# Patient Record
Sex: FEMALE | Race: WHITE | NOT HISPANIC OR LATINO
[De-identification: names, ages, dates, MRNs, and addresses within clinical notes are randomized per-mention and may not be internally consistent; named-entity substitution may affect disease eponyms.]

---

## 2019-10-22 ENCOUNTER — APPOINTMENT (OUTPATIENT)
Dept: ORTHOPEDIC SURGERY | Facility: CLINIC | Age: 79
End: 2019-10-22
Payer: MEDICARE

## 2019-10-22 PROCEDURE — 73562 X-RAY EXAM OF KNEE 3: CPT | Mod: 50

## 2019-10-22 PROCEDURE — 99204 OFFICE O/P NEW MOD 45 MIN: CPT

## 2019-10-22 PROCEDURE — 73521 X-RAY EXAM HIPS BI 2 VIEWS: CPT

## 2019-10-22 RX ORDER — CELECOXIB 200 MG/1
200 CAPSULE ORAL TWICE DAILY
Qty: 60 | Refills: 2 | Status: ACTIVE | COMMUNITY
Start: 2019-10-22 | End: 1900-01-01

## 2019-10-22 NOTE — DISCUSSION/SUMMARY
[de-identified] : Patient will present a Medrol Dosepak called to address the inflammation in multiple regions. She'll follow up with me in 9 did not really improve for further evaluation.

## 2019-10-22 NOTE — HISTORY OF PRESENT ILLNESS
[de-identified] : TAL KNEE PAIN - LEFT IS WORSE - ALSO HAS LEFT HIP PAINPatient states her left hip pain radiates from her buttock down to the lateral aspect of her thigh leg and foot. His ongoing for a few weeks and cause a specific extra injury patient denies any change in bowel or bladder habits any obvious weakness or numbness the left lower extremity.\par \par SENT FOR NEW XRAYS

## 2019-10-22 NOTE — PHYSICAL EXAM
[de-identified] : Left hip has full passive internal/external rotation negative straight leg raising test is no significant left thigh atrophy. Patient does have mild paraspinal tenderness both left and right-sided paraspinal musculature the lumbar region.\par \par Patient's left knee is definite medial joint line tenderness range of motion is 0-115°. There is crepitus with range of motion about the knee soft tissue and no effusion is noted. [de-identified] : AP and lateral both hips were obtained today show well preserved joint spaces no evidence of arthritis fracture or other bony or valgus.\par \par AP standing and the lateral sunrise view and both knees show moderate to advanced tricompartmental arthritis in both knees more radiographically advanced on the left.

## 2020-06-16 ENCOUNTER — APPOINTMENT (OUTPATIENT)
Dept: ORTHOPEDIC SURGERY | Facility: CLINIC | Age: 80
End: 2020-06-16
Payer: MEDICARE

## 2020-06-16 PROCEDURE — 99214 OFFICE O/P EST MOD 30 MIN: CPT

## 2020-06-16 PROCEDURE — 73060 X-RAY EXAM OF HUMERUS: CPT

## 2020-06-16 PROCEDURE — 73110 X-RAY EXAM OF WRIST: CPT | Mod: 50

## 2020-06-16 PROCEDURE — 73090 X-RAY EXAM OF FOREARM: CPT | Mod: 50

## 2020-06-16 RX ORDER — CELECOXIB 200 MG/1
200 CAPSULE ORAL DAILY
Qty: 30 | Refills: 0 | Status: ACTIVE | COMMUNITY
Start: 2020-06-16 | End: 1900-01-01

## 2020-06-16 NOTE — DISCUSSION/SUMMARY
[Medication Risks Reviewed] : Medication risks reviewed [de-identified] : Patient radiographically and clinically does not have a fracture of her forearm or other areas of soreness were also cleared of fractures. 2 placed on Celebrex 200 mg p.o. twice a day for 6 day course follow up in a week if not thoroughly improve with left forearm pain an MRI be warranted that point.

## 2020-06-16 NOTE — PHYSICAL EXAM
[de-identified] : Patient's amount of fullness in the area of the distal one third of the ulna. There is no point tenderness she has full painless pronation and supination of the extremity neurovascular intact distally with good  strength. [de-identified] : AP lateral of both the right humerus were obtained showing no evidence of fracture AP lateral of the left forearm was obtained showing no evidence of fracture there is an old healed distal one third ulnar fracture both wrists were also radiograph 3 views show no evidence of fracture dislocation or other abnormalities.

## 2020-06-16 NOTE — HISTORY OF PRESENT ILLNESS
[de-identified] : The patient returns with planes of new onset left forearm pain. Patient states about 2 weeks ago she was getting out of bed in the middle of the night stumbled and braced herself by placing her left arm behind her onto the bed. Patient thereafter developed swelling and discomfort in this area she has a remote history of an old ulnar fracture that was treated conservatively but limb.Patient's complaint swelling and soreness she lesion may refracture the area.

## 2020-06-16 NOTE — REASON FOR VISIT
[Initial Visit] : an initial visit for [FreeTextEntry2] : RIGHT FOREARM S/P FALL & AND BL UPPER EXTREMITY PAIN

## 2020-06-23 ENCOUNTER — APPOINTMENT (OUTPATIENT)
Dept: ORTHOPEDIC SURGERY | Facility: CLINIC | Age: 80
End: 2020-06-23

## 2020-10-08 ENCOUNTER — APPOINTMENT (OUTPATIENT)
Dept: ORTHOPEDIC SURGERY | Facility: CLINIC | Age: 80
End: 2020-10-08
Payer: MEDICARE

## 2020-10-08 PROCEDURE — 73030 X-RAY EXAM OF SHOULDER: CPT | Mod: RT

## 2020-10-08 PROCEDURE — 20611 DRAIN/INJ JOINT/BURSA W/US: CPT | Mod: RT

## 2020-10-08 PROCEDURE — 99214 OFFICE O/P EST MOD 30 MIN: CPT | Mod: 25

## 2020-10-08 NOTE — PROCEDURE
[de-identified] : Patient was given a cortisone injection into subacromial space of the right shoulder today. This was done and sterile conditions and ultrasound guidance. Patient tolerated the procedure well

## 2020-10-08 NOTE — HISTORY OF PRESENT ILLNESS
[de-identified] : Visit patient who presents with a new complaint of right shoulder pain. She is difficult with overhead activities she has pain at night decreased internal rotation has difficulty with putting on a dinner jacket. Pain is radiating into the mid aspect of the arm and sometimes into the forearm. She recalls no specific axonal injury.

## 2020-10-08 NOTE — DISCUSSION/SUMMARY
[de-identified] : Patient will follow up with me next Tuesday for follow up visit to assess her symptoms.

## 2020-10-08 NOTE — PHYSICAL EXAM
[de-identified] : Right shoulder patient has good cuff strength and is against resisted pronation and abduction. She is neurovascular intact distally. Positive terminal impingement noted. Minimal tenderness to palpation of the a.c. joint. Neurovascular intact distally. [de-identified] : AP lateral of the right shoulder were obtained showing only minimal degenerative changes of the a.c. joint. Otherwise there are no significant bony abnormalities noted

## 2020-10-13 ENCOUNTER — APPOINTMENT (OUTPATIENT)
Dept: ORTHOPEDIC SURGERY | Facility: CLINIC | Age: 80
End: 2020-10-13
Payer: MEDICARE

## 2020-10-13 DIAGNOSIS — M25.511 PAIN IN RIGHT SHOULDER: ICD-10-CM

## 2020-10-13 PROCEDURE — 20611 DRAIN/INJ JOINT/BURSA W/US: CPT | Mod: 50

## 2020-10-13 PROCEDURE — 99214 OFFICE O/P EST MOD 30 MIN: CPT | Mod: 25

## 2020-10-13 NOTE — HISTORY OF PRESENT ILLNESS
[de-identified] : Patient states she feels significant improvement proximal 90% improved with the previous cortisone injection of the right shoulder. She does have some lingering discomfort with overhead activities and it is not equal to be less previously affected left shoulder.

## 2020-10-13 NOTE — PHYSICAL EXAM
[de-identified] : Right shoulder patient has good cuff strength and is against resisted pronation and abduction. She is neurovascular intact distally. Positive terminal impingement noted. Minimal tenderness to palpation of the a.c. joint. Neurovascular intact distally.\par \par Left shoulder was examined today also has good cuff strength when tested against resisted forward elevation abduction. Neurovascular intact distally positive impingement sign.

## 2020-10-13 NOTE — PROCEDURE
[de-identified] : Patient was given sterile subacromial injections to both right and left shoulder today. Both done under sterile conditions via posterior approach.

## 2020-10-20 ENCOUNTER — APPOINTMENT (OUTPATIENT)
Dept: ORTHOPEDIC SURGERY | Facility: CLINIC | Age: 80
End: 2020-10-20

## 2020-11-12 ENCOUNTER — APPOINTMENT (OUTPATIENT)
Dept: ORTHOPEDIC SURGERY | Facility: CLINIC | Age: 80
End: 2020-11-12
Payer: MEDICARE

## 2020-11-12 VITALS — WEIGHT: 250 LBS | BODY MASS INDEX: 45.73 KG/M2

## 2020-11-12 VITALS — HEIGHT: 62 IN | BODY MASS INDEX: 42.33 KG/M2 | WEIGHT: 230 LBS

## 2020-11-12 VITALS — TEMPERATURE: 97 F

## 2020-11-12 DIAGNOSIS — S46.002A UNSPECIFIED INJURY OF MUSCLE(S) AND TENDON(S) OF THE ROTATOR CUFF OF LEFT SHOULDER, INITIAL ENCOUNTER: ICD-10-CM

## 2020-11-12 PROCEDURE — 73030 X-RAY EXAM OF SHOULDER: CPT | Mod: LT

## 2020-11-12 PROCEDURE — 76882 US LMTD JT/FCL EVL NVASC XTR: CPT | Mod: LT,59

## 2020-11-12 PROCEDURE — 99214 OFFICE O/P EST MOD 30 MIN: CPT | Mod: 25

## 2020-11-12 PROCEDURE — 20611 DRAIN/INJ JOINT/BURSA W/US: CPT | Mod: LT

## 2020-12-02 ENCOUNTER — APPOINTMENT (OUTPATIENT)
Dept: ORTHOPEDIC SURGERY | Facility: CLINIC | Age: 80
End: 2020-12-02
Payer: MEDICARE

## 2020-12-02 PROCEDURE — 99214 OFFICE O/P EST MOD 30 MIN: CPT

## 2020-12-02 RX ORDER — OXYCODONE AND ACETAMINOPHEN 7.5; 325 MG/1; MG/1
7.5-325 TABLET ORAL
Qty: 42 | Refills: 0 | Status: ACTIVE | COMMUNITY
Start: 2020-12-02 | End: 1900-01-01

## 2020-12-02 NOTE — PROCEDURE
[de-identified] : \par DIAGNOSTIC ULTRASOUND LEFT SHOULDER\par \par DIAGNOSTIC SONOGRAPHY of the Rotator Cuff Soft Tissue of the LEFT  SHOULDER was performed in Multiple Scan Planes with varying transducer frequencies.\par Imaging of the Supraspinatus Tendon reveals COMPLETE TEAR - 1- 1.5 cm \par Imaging of the Biceps Tendon reveals no significant tear.\par Imaging of the Subscapularis Tendon reveals no significant tear.\par Imaging of the Infraspinatus Tendon reveals no significant tear.\par Key images were save digitally and reviewed with patient.\par \par \par \par INJECTION LEFT SHOULDER SA SPACE\par \par Patient has demonstrated limited relief from NSAIDS, rest, exercises / PT, and after discussion of the risks and benefits, the patient has elected to proceed with an ULTRASOUND GUIDED injection into the LEFT SUBACROMIAL  SPACE LATERAL APPROACH \par  \par Confirmed that the patient does not have history of prior adverse reactions, active, infections, or relevant allergies. There was no effusion, erythema, or warmth, and the skin was clear\par \par The skin was sterilized with alcohol. Ethyl Chloride was used as a topical anesthetic. Routine sterile technique. \par The site was injected UTILIZING ULTRASOUND GUIDANCE to confirm appropriate placement of the needle-\par with a mixture of medication and local anesthetic. The injection was completed without complication and a bandage was applied.\par  \par The patient tolerated the procedure well and was given post-injection instructions.Rec: Cold therapy, analgesics, avoid heavy activity.\par MEDICATION: 4cc of 1% xylocaine + 10mg of KENALOG\par \par

## 2020-12-02 NOTE — PHYSICAL EXAM
[de-identified] : PHYSICAL EXAM RIGHT   SHOULDER\par \par NORMAL POSTURE / SCAPULAR PROTRACTION\par AROM \par RIGHT   130 / 130 / 80 / 20\par TENDER: SA REGION ANTERIOR AND LATERAL \par \par SPECIAL TESTING :\par STEPHENSON - POSITIVE \par DONALD - POSITIVE \par SPEED TEST - POSITIVE\par \par MONSALVE - NEGATIVE \par APPREHENSION AND SUPPRESSION - NEGATIVE \par \par RC STRENGTH TESTING \par SS:  4/5\par SUB 5/5\par IS     5/5\par BICEPS  5/5\par \par SENSATION  - GROSSLY INTACT\par \par

## 2020-12-02 NOTE — DISCUSSION/SUMMARY
[de-identified] : PREOP SHOULDER SURGERY DISCUSSION:\par \par PROCEDURE DISCUSSED - QUESTIONS ANSWERED\par PATIENT WISHES TO PROCEED\par \par POST OP CARE AND LIMITATIONS REVIEWED - HANDOUT PROVIDED \par \par COLD PACKS RECOMMENDED\par ANALGESICS PRESCRIBED \par \par \par THERE ARE NO GUARANTEES THAT ALL SYMPTOMS WILL BE ALLEVIATED  \par SHOULDER ARTHROSCOPY, ACROMIOPLASTY, DEBRIDEMENT,  RC REPAIR AND LABRUM REPAIRS- ON AVERAGE 75- 85% SATISFACTORY RESULTS FOR TEARS < 3CM AFTER 9-12 MONTHS HEALING AND REHABILITATION. \par \par REPAIRS WILL REQUIRE STRICT SHOULDER IMMOBILIZER 4-6 WEEKS\par \par RC TEARS 3CM OR LARGER MAY REQUIRE COLLAGEN PATCH AUGMENTATION GENERALLY HAVE LESS SATISFACTORY RESULTS\par \par PHYSICAL THERAPY REQUIRED 2X WEEK FOR  MINIMUM 8-12 WEEKS FOR ALL PROCEDURES \par CONTINUED HOME EXERCISES 6-9 MONTHS AFTER THAT REQUIRED FOR OPTIMAL OUTCOMES \par \par ROUTINE SURGICAL AND ANESTHETIC RISKS INCLUDE RISK OF SURGICAL INFECTION, ANESTHETIC COMPLICATION OR ALLERGY, POSSIBLE RETEARS OR PROGRESSION OF TEAR, STIFFNESS OF SHOULDER AND UNSATISFACTORY OUTCOMES\par \par PATIENT UNDERSTANDS AND WISHES TO PROCEED\par

## 2020-12-02 NOTE — HISTORY OF PRESENT ILLNESS
[de-identified] : RIGHT SHOULDER - RHD\par AT LEAST  2 MONTHS\par PAIN LEVEL 7/10, WAS A 10 BEFORE CORTISONE INJECTION\par CONSTANT PAIN\par SHARP\par RADIATING PAIN\par NUMBNESS\par WORSE WITH LIFTING, REACHING ACROSS AND BEHIND\par BETTER WITH CORTISONE INJECTION 10/08/20,  REST \par WEAKNESS\par LACK OF ROM

## 2020-12-02 NOTE — PHYSICAL EXAM
[de-identified] : PHYSICAL EXAM LEFT AND RIGHT   SHOULDER\par \par SCAPULAR PROTRACTION\par AROM \par LEFT  140 / 140 / 80 / 20\par RIGHT   140 / 140 / 80 / 20\par TENDER: SA REGION ANTERIOR AND LATERAL \par \par SPECIAL TESTING :\par STEPHENSON - POSITIVE \par DONALD - POSITIVE \par SPEED TEST - POSITIVE\par \par MONSALVE - NEGATIVE \par APPREHENSION AND SUPPRESSION - NEGATIVE \par \par RC STRENGTH TESTING \par SS:  4/5\par SUB 5/5\par IS     5/5\par BICEPS  5/5\par \par SENSATION  - GROSSLY INTACT\par \par

## 2020-12-02 NOTE — HISTORY OF PRESENT ILLNESS
[de-identified] : RIGHT SHOULDER\par FOLLOW UP\par PRESURGICAL VISIT\par INTERMITTENT PAIN\par PAIN LEVEL 7/10\par \par CORTISONE INJECTION LEFT SHOULDER 11/12/20- SLIGHTLY HELPFUL

## 2020-12-02 NOTE — DISCUSSION/SUMMARY
[de-identified] : PREOP SHOULDER SURGERY DISCUSSION:\par \par PROCEDURE DISCUSSED - QUESTIONS ANSWERED\par PATIENT WISHES TO PROCEED\par \par POST OP CARE AND LIMITATIONS REVIEWED - HANDOUT PROVIDED \par \par COLD PACKS RECOMMENDED\par ANALGESICS PRESCRIBED \par \par \par THERE ARE NO GUARANTEES THAT ALL SYMPTOMS WILL BE ALLEVIATED  \par SHOULDER ARTHROSCOPY, ACROMIOPLASTY, DEBRIDEMENT,  RC REPAIR AND LABRUM REPAIRS- ON AVERAGE 75- 85% SATISFACTORY RESULTS FOR TEARS < 3CM AFTER 9-12 MONTHS HEALING AND REHABILITATION. \par \par REPAIRS WILL REQUIRE STRICT SHOULDER IMMOBILIZER 4-6 WEEKS\par \par RC TEARS 3CM OR LARGER MAY REQUIRE COLLAGEN PATCH AUGMENTATION GENERALLY HAVE LESS SATISFACTORY RESULTS\par \par PHYSICAL THERAPY REQUIRED 2X WEEK FOR  MINIMUM 8-12 WEEKS FOR ALL PROCEDURES \par CONTINUED HOME EXERCISES 6-9 MONTHS AFTER THAT REQUIRED FOR OPTIMAL OUTCOMES \par \par ROUTINE SURGICAL AND ANESTHETIC RISKS INCLUDE RISK OF SURGICAL INFECTION, ANESTHETIC COMPLICATION OR ALLERGY, POSSIBLE RETEARS OR PROGRESSION OF TEAR, STIFFNESS OF SHOULDER AND UNSATISFACTORY OUTCOMES\par \par PATIENT UNDERSTANDS AND WISHES TO PROCEED\par

## 2020-12-07 ENCOUNTER — TRANSCRIPTION ENCOUNTER (OUTPATIENT)
Age: 80
End: 2020-12-07

## 2020-12-08 ENCOUNTER — APPOINTMENT (OUTPATIENT)
Dept: ORTHOPEDIC SURGERY | Facility: HOSPITAL | Age: 80
End: 2020-12-08
Payer: MEDICARE

## 2020-12-08 ENCOUNTER — OUTPATIENT (OUTPATIENT)
Dept: OUTPATIENT SERVICES | Facility: HOSPITAL | Age: 80
LOS: 1 days | Discharge: HOME | End: 2020-12-08
Payer: MEDICARE

## 2020-12-08 VITALS
RESPIRATION RATE: 16 BRPM | OXYGEN SATURATION: 95 % | TEMPERATURE: 97 F | HEIGHT: 61.5 IN | HEART RATE: 88 BPM | SYSTOLIC BLOOD PRESSURE: 130 MMHG | WEIGHT: 230.6 LBS | DIASTOLIC BLOOD PRESSURE: 79 MMHG

## 2020-12-08 VITALS
SYSTOLIC BLOOD PRESSURE: 152 MMHG | OXYGEN SATURATION: 96 % | RESPIRATION RATE: 24 BRPM | DIASTOLIC BLOOD PRESSURE: 78 MMHG | HEART RATE: 86 BPM

## 2020-12-08 DIAGNOSIS — Z98.49 CATARACT EXTRACTION STATUS, UNSPECIFIED EYE: Chronic | ICD-10-CM

## 2020-12-08 DIAGNOSIS — Z98.89 OTHER SPECIFIED POSTPROCEDURAL STATES: Chronic | ICD-10-CM

## 2020-12-08 DIAGNOSIS — Z90.10 ACQUIRED ABSENCE OF UNSPECIFIED BREAST AND NIPPLE: Chronic | ICD-10-CM

## 2020-12-08 PROCEDURE — C1713: CPT

## 2020-12-08 PROCEDURE — 29827 SHO ARTHRS SRG RT8TR CUF RPR: CPT | Mod: RT

## 2020-12-08 PROCEDURE — 29826 SHO ARTHRS SRG DECOMPRESSION: CPT | Mod: RT

## 2020-12-08 PROCEDURE — 94640 AIRWAY INHALATION TREATMENT: CPT

## 2020-12-08 PROCEDURE — 29823 SHO ARTHRS SRG XTNSV DBRDMT: CPT | Mod: RT

## 2020-12-08 PROCEDURE — 29826 SHO ARTHRS SRG DECOMPRESSION: CPT | Mod: RT,59

## 2020-12-08 PROCEDURE — 29819 SHO ARTHRS SRG RMVL LOOSE/FB: CPT | Mod: RT,59

## 2020-12-08 PROCEDURE — 29823 SHO ARTHRS SRG XTNSV DBRDMT: CPT | Mod: RT,59

## 2020-12-08 PROCEDURE — 29820 SHO ARTHRS SRG PRTL SYNVCT: CPT | Mod: RT,59

## 2020-12-08 PROCEDURE — 29825 SHO ARTHRS SRG LSS&RESCJ ADS: CPT | Mod: RT,59

## 2020-12-08 RX ORDER — ALBUTEROL 90 UG/1
2.5 AEROSOL, METERED ORAL ONCE
Refills: 0 | Status: COMPLETED | OUTPATIENT
Start: 2020-12-08 | End: 2020-12-08

## 2020-12-08 RX ORDER — BUDESONIDE AND FORMOTEROL FUMARATE DIHYDRATE 160; 4.5 UG/1; UG/1
2 AEROSOL RESPIRATORY (INHALATION) ONCE
Refills: 0 | Status: COMPLETED | OUTPATIENT
Start: 2020-12-08 | End: 2020-12-08

## 2020-12-08 RX ORDER — ACETAMINOPHEN 500 MG
1000 TABLET ORAL ONCE
Refills: 0 | Status: COMPLETED | OUTPATIENT
Start: 2020-12-08 | End: 2020-12-08

## 2020-12-08 RX ORDER — HYDROMORPHONE HYDROCHLORIDE 2 MG/ML
0.5 INJECTION INTRAMUSCULAR; INTRAVENOUS; SUBCUTANEOUS
Refills: 0 | Status: DISCONTINUED | OUTPATIENT
Start: 2020-12-08 | End: 2020-12-08

## 2020-12-08 RX ORDER — SODIUM CHLORIDE 9 MG/ML
1000 INJECTION, SOLUTION INTRAVENOUS
Refills: 0 | Status: DISCONTINUED | OUTPATIENT
Start: 2020-12-08 | End: 2020-12-08

## 2020-12-08 RX ADMIN — ALBUTEROL 2.5 MILLIGRAM(S): 90 AEROSOL, METERED ORAL at 14:59

## 2020-12-08 RX ADMIN — Medication 1000 MILLIGRAM(S): at 15:06

## 2020-12-08 RX ADMIN — Medication 20 MILLIGRAM(S): at 15:16

## 2020-12-08 RX ADMIN — BUDESONIDE AND FORMOTEROL FUMARATE DIHYDRATE 2 PUFF(S): 160; 4.5 AEROSOL RESPIRATORY (INHALATION) at 15:16

## 2020-12-08 NOTE — PACU DISCHARGE NOTE - COMMENTS
Patient discharged. Patient in stable condition, fully meets PACU Discharge Criteria. Denies chest pain or difficulty breathing. Surgical site clean, dry and intact. IV access discontinued. Passed void trial. Discharge instructions provided. Able to verbalize understanding of instructions. Safety measures maintained.

## 2020-12-08 NOTE — ASU PATIENT PROFILE, ADULT - PMH
Asthma    Breast cancer  left  GERD (gastroesophageal reflux disease)    High cholesterol    HTN (hypertension)    Thyroid disease

## 2020-12-11 ENCOUNTER — APPOINTMENT (OUTPATIENT)
Dept: ORTHOPEDIC SURGERY | Facility: CLINIC | Age: 80
End: 2020-12-11
Payer: MEDICARE

## 2020-12-11 PROCEDURE — 99024 POSTOP FOLLOW-UP VISIT: CPT

## 2020-12-11 PROCEDURE — 73030 X-RAY EXAM OF SHOULDER: CPT | Mod: RT

## 2020-12-11 NOTE — PHYSICAL EXAM
[de-identified] : PHYSICAL EXAM SHOULDER POSTOP \par \par BANDAGE REMOVED\par PORTALS CLEAN AND DRY -  NO ERYTHEMA OR CALOR\par SUTURES REMOVED STERISTRIPS AND WATERPROOF BANDAGES APPLIES\par AROM NOT TESTED - DISTAL MOTOR AND SENSORY EXAN GROSSLY INTACT \par SHOULDER IMMOBILIZER APPLIED WITH INSTRUCTIONS FOR USE - FULL TIME 6 WEEKS\par REMOVE ONLY FOR SHOWERS AND TO CHANGE CLOTHES\par  [de-identified] : RIGHT SHOULDER XRAY (2 VIEWS - AP AND OUTLET)  -  \par NO OBVIOUS FRACTURE OR DISLOCATION, \par SATISFACTORY DECOMPRESSION NOTED  , \par ANCHOR SILHOUETTE VISIBLE IN GREATER TUBEROSITY- SATISFACTORY POSITION\par

## 2020-12-11 NOTE — DISCUSSION/SUMMARY
[de-identified] : DECEMBER 8 , 2020 - RIGHT 2.5 X 1 CM RC REPAIR , BICEPS TENOTOMY, TOMMY \par \par POST OP REPAIR:\par \par COLD THERAPY,ANALGESICS AS NEEDED\par \par SHOULDER IMMOBILIZED FULL TIME X 6 WEEKS - STRICT NO AROM - DESKTOP WORK ALLOWED\par \par START PENDULUM EXERCISES 3 WEEK POSTOP\par \par START AAROM FLEXION, JP  4-5  WEEKS POST OP\par \par PO  6 WEEKS - ADVANCE TO P.T.\par

## 2020-12-11 NOTE — HISTORY OF PRESENT ILLNESS
[de-identified] : RIGHT SHOULDER\par FOLLOW UP, POST OP 3 DAYS\par DECEMBER 8 , 2020 - RIGHT 2.5 X 1 CM RC REPAIR , BICEPS TENOTOMY, TOMMY \par PAIN LEVEL 5/10\par NO NUMBNESS AND TINGLING\par MILD SWELLING\par GENERAL WEAKNESS\par PT IN SLING

## 2020-12-22 ENCOUNTER — APPOINTMENT (OUTPATIENT)
Dept: ORTHOPEDIC SURGERY | Facility: CLINIC | Age: 80
End: 2020-12-22
Payer: MEDICARE

## 2020-12-22 PROCEDURE — 99214 OFFICE O/P EST MOD 30 MIN: CPT

## 2020-12-22 PROCEDURE — 72100 X-RAY EXAM L-S SPINE 2/3 VWS: CPT

## 2020-12-22 NOTE — PHYSICAL EXAM
[de-identified] : Patient has full passive internal/external rotation of both hips she has mild paraspinal tenderness to palpation of the lower lumbar segments. Otherwise the exam is benign neurovascular intact in both lower extremities. [de-identified] : AP and lateral of lumbar spine were obtained special attention paid to the vertebral bodies no evidence of fracture. She does have moderate degenerative changes throughout the lumbar spine.

## 2020-12-22 NOTE — HISTORY OF PRESENT ILLNESS
[de-identified] : Patient status post Robert 12 8 this is a few days after right shoulder surgery. Patient has been complaining of low back pain which radiates across a small bur back denies any radiations and dry to left lower extremity or groin. Denies any change in bowel habits. Does have a history of renal calculi.

## 2020-12-22 NOTE — DISCUSSION/SUMMARY
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

## 2020-12-22 NOTE — REASON FOR VISIT
[Follow-Up Visit] : a follow-up visit for [FreeTextEntry2] : PATIENT FELL ON 12/8/20 - HAS LOW BACK PAIN - SENT [facility] NEW XRAYS

## 2021-01-05 ENCOUNTER — APPOINTMENT (OUTPATIENT)
Dept: ORTHOPEDIC SURGERY | Facility: CLINIC | Age: 81
End: 2021-01-05
Payer: MEDICARE

## 2021-01-05 PROCEDURE — 99024 POSTOP FOLLOW-UP VISIT: CPT | Mod: 55

## 2021-01-05 NOTE — HISTORY OF PRESENT ILLNESS
[de-identified] : DECEMBER 8 , 2020 - RIGHT 2.5 X 1 CM RC REPAIR , BICEPS TENOTOMY, TOMMY.  [de-identified] : Patient is a postop patient of Dr. Huerta presenting for followup evaluation.  She is now 4 weeks postop.  States wearing the sling.  Denies any paresthesias. [de-identified] : RIGHT shoulder, sling intact.  Full elbow ROM.  Wounds healed.  NLST C5-T1.   [de-identified] : DECEMBER 8 , 2020 - RIGHT 2.5 X 1 CM RC REPAIR , BICEPS TENOTOMY, TOMMY.  [de-identified] : Sling for 2 more weeks.  Home exercises instructed.  Follow-up in 3 weeks with Dr. Huerta

## 2021-01-05 NOTE — HISTORY OF PRESENT ILLNESS
[de-identified] : DECEMBER 8 , 2020 - RIGHT 2.5 X 1 CM RC REPAIR , BICEPS TENOTOMY, TOMMY.  [de-identified] : Patient is a postop patient of Dr. Huerta presenting for followup evaluation.  She is now 4 weeks postop.  States wearing the sling.  Denies any paresthesias. [de-identified] : RIGHT shoulder, sling intact.  Full elbow ROM.  Wounds healed.  NLST C5-T1.   [de-identified] : DECEMBER 8 , 2020 - RIGHT 2.5 X 1 CM RC REPAIR , BICEPS TENOTOMY, TOMMY.  [de-identified] : Sling for 2 more weeks.  Home exercises instructed.  Follow-up in 3 weeks with Dr. Huerta

## 2021-02-05 ENCOUNTER — APPOINTMENT (OUTPATIENT)
Dept: ORTHOPEDIC SURGERY | Facility: CLINIC | Age: 81
End: 2021-02-05
Payer: MEDICARE

## 2021-02-05 PROCEDURE — 99024 POSTOP FOLLOW-UP VISIT: CPT

## 2021-02-05 NOTE — DISCUSSION/SUMMARY
[de-identified] : POST OP REPAIR:\par \par COLD THERAPY,ANALGESICS AS NEEDED\par \par P.T. 2 X  6 WEEKS \par

## 2021-02-05 NOTE — HISTORY OF PRESENT ILLNESS
[de-identified] : RIGHT SHOULDER\par FOLLOW UP, POST OP\par DECEMBER 8 , 2020 - RIGHT 2.5 X 1 CM RC REPAIR , BICEPS TENOTOMY, TOMMY \par PAIN LEVEL 5/10\par P.T. 2X WEEK, HELPFUL

## 2021-02-05 NOTE — PHYSICAL EXAM
[de-identified] : PHYSICAL EXAM SHOULDER POSTOP \par \par BANDAGE REMOVED\par PORTALS CLEAN AND DRY -  NO ERYTHEMA OR CALOR\par \par AROM RIGHT 110 / 110\par

## 2021-03-09 ENCOUNTER — APPOINTMENT (OUTPATIENT)
Dept: ORTHOPEDIC SURGERY | Facility: CLINIC | Age: 81
End: 2021-03-09
Payer: MEDICARE

## 2021-03-09 PROCEDURE — 73562 X-RAY EXAM OF KNEE 3: CPT | Mod: RT

## 2021-03-09 PROCEDURE — 99214 OFFICE O/P EST MOD 30 MIN: CPT

## 2021-03-09 PROCEDURE — 73521 X-RAY EXAM HIPS BI 2 VIEWS: CPT

## 2021-03-09 RX ORDER — METHYLPREDNISOLONE 4 MG/1
4 TABLET ORAL
Qty: 1 | Refills: 2 | Status: ACTIVE | COMMUNITY
Start: 2021-03-09 | End: 1900-01-01

## 2021-03-09 NOTE — HISTORY OF PRESENT ILLNESS
[de-identified] : Patient presents today with 2-3 month history of right knee pain. Patient states his pain seems to originate from her posterior buttock on the right side into the groin and radiates to the right knee. Her symptoms are intermittent sometimes severe she denies any change in bowel or bladder habits or any weakness or numbness. She also denies any accident or injury history.She does relate a remote history of a few years ago having undergone epidural injections for disc herniated lumbar segments

## 2021-03-09 NOTE — PHYSICAL EXAM
[de-identified] : Patient has full passive internal/external rotation of the right hip with no restriction negative straight leg raising test. Patient has no significant atrophy weakness or numbness in the right lower extremity.\par \par Lumbar spine patient has mild paraspinal tenderness to the right-sided lumbar paraspinal musculature. [de-identified] : AP standing individual lateral and sunrise views were obtained showing fairly well-preserved joint space both compartments of the right knee. AP lateral of both the right and left hips were obtained show well-preserved joint spaces with no significant arthritis.

## 2021-03-09 NOTE — DISCUSSION/SUMMARY
[Medication Risks Reviewed] : Medication risks reviewed [de-identified] : Patient's exam, history, and radiographs are all consistent with right-sided lumbar radiculopathy. We'll place the patient on a Medrol Dosepak she'll follow up with me in a week to 10 days if not improved at that point MRI lumbar spine may be warranted.

## 2021-03-18 ENCOUNTER — APPOINTMENT (OUTPATIENT)
Dept: ORTHOPEDIC SURGERY | Facility: CLINIC | Age: 81
End: 2021-03-18
Payer: MEDICARE

## 2021-03-18 DIAGNOSIS — M54.16 RADICULOPATHY, LUMBAR REGION: ICD-10-CM

## 2021-03-18 PROCEDURE — 99214 OFFICE O/P EST MOD 30 MIN: CPT

## 2021-03-18 NOTE — PHYSICAL EXAM
[de-identified] : Patient has full passive internal/external rotation of the right hip with no restriction negative straight leg raising test. Patient has no significant atrophy weakness or numbness in the right lower extremity.\par \par Lumbar spine patient has mild paraspinal tenderness to the right-sided lumbar paraspinal musculature.

## 2021-03-18 NOTE — HISTORY OF PRESENT ILLNESS
[de-identified] : Patient returns today complaining of essentially no sustained improvement of her low back pain and radiating pain to her right groin and leg. Patient did feel some initial relief from the first few days of her steroid taper but now the pain has returned to the same premedication level. She denies any recent change in bowel or bladder habits or any obvious weakness or numbness.

## 2021-03-18 NOTE — DISCUSSION/SUMMARY
[de-identified] : Patient has been taking some oxycodone given to her by another physician to help her sleep. We'll place her get him a Medrol Dosepak to help reduce her discomfort and more tender. She also suffered an MRI of the lumbar spine to evaluate her from both size and location of a right-sided lumbar disc.

## 2021-03-19 ENCOUNTER — APPOINTMENT (OUTPATIENT)
Dept: ORTHOPEDIC SURGERY | Facility: CLINIC | Age: 81
End: 2021-03-19

## 2021-03-29 ENCOUNTER — APPOINTMENT (OUTPATIENT)
Dept: ORTHOPEDIC SURGERY | Facility: CLINIC | Age: 81
End: 2021-03-29
Payer: MEDICARE

## 2021-03-29 PROCEDURE — 99213 OFFICE O/P EST LOW 20 MIN: CPT

## 2021-03-29 NOTE — PHYSICAL EXAM
[de-identified] : PHYSICAL EXAM SHOULDER POSTOP \par \par BANDAGE REMOVED\par PORTALS CLEAN AND DRY -  NO ERYTHEMA OR CALOR\par \par AROM RIGHT 130 / 120 \par

## 2021-03-29 NOTE — HISTORY OF PRESENT ILLNESS
[de-identified] : RIGHT SHOULDER \par FOLLOW UP, POST OP ALMOST 4 MONTHS \par PAIN RADIATES DOWN ELBOW \par DECEMBER 8 , 2020 - RIGHT 2.5 X 1 CM RC REPAIR , BICEPS TENOTOMY, TOMMY \par PAIN LEVEL: 8/10\par P.T. 2 X WEEK- HELPFUL \par AT HOME EXERCISES-HELPFUL

## 2021-04-29 ENCOUNTER — APPOINTMENT (OUTPATIENT)
Dept: ORTHOPEDIC SURGERY | Facility: CLINIC | Age: 81
End: 2021-04-29
Payer: MEDICARE

## 2021-04-29 PROCEDURE — 99214 OFFICE O/P EST MOD 30 MIN: CPT

## 2021-04-29 NOTE — DISCUSSION/SUMMARY
[de-identified] : Patient has significant spinal stenosis. She'll be referred to our pain management specialist for further evaluation and treatment possible epidural injection.

## 2021-04-29 NOTE — PHYSICAL EXAM
[de-identified] : Patient has tenderness to palpation of lumbar segments both right and left sided. She has full passive internal/external rotation of the left hip negative straight leg raising test neurovascularly intact distally.

## 2021-04-29 NOTE — HISTORY OF PRESENT ILLNESS
[de-identified] : Patient returns today she is complaining mostly of low back pain with radiating pain to her left hip and down the left leg shield complains of some radiations into the right hip occasionally. She had a recent MRI performed which showed showed fairly bent spinal stenosis of lumbar spine.

## 2021-05-07 ENCOUNTER — APPOINTMENT (OUTPATIENT)
Dept: PHYSICAL MEDICINE AND REHAB | Facility: CLINIC | Age: 81
End: 2021-05-07
Payer: MEDICARE

## 2021-05-07 DIAGNOSIS — M51.26 OTHER INTERVERTEBRAL DISC DISPLACEMENT, LUMBAR REGION: ICD-10-CM

## 2021-05-07 DIAGNOSIS — M48.062 SPINAL STENOSIS, LUMBAR REGION WITH NEUROGENIC CLAUDICATION: ICD-10-CM

## 2021-05-07 PROCEDURE — 64484 NJX AA&/STRD TFRM EPI L/S EA: CPT | Mod: RT

## 2021-05-07 PROCEDURE — 64483 NJX AA&/STRD TFRM EPI L/S 1: CPT | Mod: RT

## 2021-05-07 RX ORDER — NAPROXEN SODIUM 220 MG
TABLET ORAL
Refills: 0 | Status: ACTIVE | COMMUNITY

## 2021-06-02 ENCOUNTER — APPOINTMENT (OUTPATIENT)
Dept: ORTHOPEDIC SURGERY | Facility: CLINIC | Age: 81
End: 2021-06-02
Payer: MEDICARE

## 2021-06-02 PROCEDURE — 99213 OFFICE O/P EST LOW 20 MIN: CPT

## 2021-06-02 NOTE — PHYSICAL EXAM
[de-identified] : PHYSICAL EXAM SHOULDER POSTOP \par \par BANDAGE REMOVED\par PORTALS CLEAN AND DRY -  NO ERYTHEMA OR CALOR\par \par AROM RIGHT 150 / 130 / 90 / 30  \par

## 2021-06-02 NOTE — HISTORY OF PRESENT ILLNESS
[de-identified] : RIGHT SHOULDER CHRONIC PAIN \par FOLLOW UP\par POST OP - 6 MONTHS \par DECEMBER 8 , 2020 - RIGHT 2.5 X 1 CM RC REPAIR , BICEPS TENOTOMY, TOMMY \par PAIN LEVEL: 6/10 \par PAINFUL WHEN REACHING TO BACK \par P.T ENDED IN JANUARY- HELPFUL \par LEAVING FOR [place] NEXT WEEK \par

## 2021-06-02 NOTE — DISCUSSION/SUMMARY
[de-identified] : CONTINUE HOME EXERCISES\par \par SWIMMING OK\par NSAIDS AS NEEDED \par \par ROUTINE OFFICE FU AFTER HER SUMMER IN [place]

## 2022-01-05 ENCOUNTER — APPOINTMENT (OUTPATIENT)
Dept: ORTHOPEDIC SURGERY | Facility: CLINIC | Age: 82
End: 2022-01-05

## 2023-02-16 ENCOUNTER — APPOINTMENT (OUTPATIENT)
Dept: ORTHOPEDIC SURGERY | Facility: CLINIC | Age: 83
End: 2023-02-16
Payer: MEDICARE

## 2023-02-16 DIAGNOSIS — M17.0 BILATERAL PRIMARY OSTEOARTHRITIS OF KNEE: ICD-10-CM

## 2023-02-16 DIAGNOSIS — M25.569 PAIN IN UNSPECIFIED KNEE: ICD-10-CM

## 2023-02-16 PROCEDURE — 99215 OFFICE O/P EST HI 40 MIN: CPT

## 2023-02-16 NOTE — PHYSICAL EXAM
[de-identified] : Patient on exam today right knee definite medial joint line tenderness range of motion 0 to 125 degrees knee stable to stress varus valgus stress there is some warmth and soft tissue swelling but no obvious effusion.\par \par Similar findings on the contralateral left knee.  Definite medial joint line tenderness range of motion 0 to 125 degrees knee stable to stress varus valgus multiple extension and 90 degrees of flexion.  There is warmth and soft tissue in but no obvious effusion.\par \par Left leg has some tenderness to palpation in the midshaft of the tibia.  There are findings here most consistent with varicose veins that have become distended.

## 2023-02-16 NOTE — HISTORY OF PRESENT ILLNESS
[de-identified] : Follow-up visit for this patient not seen in almost 2 years.  She is here with 2 complaints 1 is bilateral knee pain patient has already established diagnosis of moderate osteoarthritis of both knees she has difficulty with stair climbing getting out of a seated position.  Patient is status post remote surgery arthroscopic right knee.  She states she has increasing discomfort especially at night.  Patient's second complaint is that of swelling and pain in her left leg.  Patient recalls that she fell in her yard and struck the lateral aspect of her distal third of her leg.  Patient had some swelling and bleeding in the area that she has been noticing some discomfort in that area she is worried she may have an infection.

## 2023-02-16 NOTE — DISCUSSION/SUMMARY
[de-identified] : Patient I discussed at length the underlying etiology of her left leg pain I believe it is related to varicosities.  Patient does have a concern about a deep infection after her fall and wound so she will be sent for radiographs to help evaluate for any bony changes.  Patient will also be sent for radiographs of both knees which we can review later today and call the patient with our findings.  I believe she does have advanced osteoarthritis of both knees and patient will be presented with those findings and options for treatment such as HA injections or possible knee replacement surgery if we fail to see sustained symptomatic improvement with conservative measures.\par \par Today's consultation lasted 40 minutes.

## 2023-02-23 ENCOUNTER — APPOINTMENT (OUTPATIENT)
Dept: ORTHOPEDIC SURGERY | Facility: CLINIC | Age: 83
End: 2023-02-23

## 2023-03-29 ENCOUNTER — APPOINTMENT (OUTPATIENT)
Dept: ORTHOPEDIC SURGERY | Facility: CLINIC | Age: 83
End: 2023-03-29
Payer: MEDICARE

## 2023-03-29 DIAGNOSIS — M75.101 UNSPECIFIED ROTATOR CUFF TEAR OR RUPTURE OF RIGHT SHOULDER, NOT SPECIFIED AS TRAUMATIC: ICD-10-CM

## 2023-03-29 PROCEDURE — 99213 OFFICE O/P EST LOW 20 MIN: CPT | Mod: 25

## 2023-03-29 PROCEDURE — 20611 DRAIN/INJ JOINT/BURSA W/US: CPT | Mod: RT

## 2023-03-29 NOTE — PHYSICAL EXAM
[de-identified] : PHYSICAL EXAM SHOULDER POSTOP \par \par BANDAGE REMOVED\par PORTALS CLEAN AND DRY -  NO ERYTHEMA OR CALOR\par \par AROM RIGHT 120 /  130 / 70 / 20\par

## 2023-03-29 NOTE — PROCEDURE
[de-identified] : INJECTION RIGHT SHOULDER SA SPACE\par \par Patient has demonstrated limited relief from NSAIDS, rest, exercises / PT, and after discussion of the risks and benefits, the patient has elected to proceed with an ULTRASOUND GUIDED injection into the RIGHT SUBACROMIAL  SPACE LATERAL APPROACH \par  \par Confirmed that the patient does not have history of prior adverse reactions, active, infections, or relevant allergies. There was no effusion, erythema, or warmth, and the skin was clear\par \par The skin was sterilized with alcohol. Ethyl Chloride was used as a topical anesthetic. Routine sterile technique. \par The site was injected UTILIZING ULTRASOUND GUIDANCE to confirm appropriate placement of the needle-\par with a mixture of medication and local anesthetic. The injection was completed without complication and a bandage was applied.\par  \par The patient tolerated the procedure well and was given post-injection instructions.Rec: Cold therapy, analgesics, avoid heavy activity.\par MEDICATION: 4cc of 1% xylocaine + 10mg of KENALOG\par \par

## 2023-03-29 NOTE — HISTORY OF PRESENT ILLNESS
[de-identified] : RIGHT SHOULDER PAIN\par FLARED UP 1 MONTH AGO \par PAIN LEVEL: 9/10 \par DECEMBER 8 , 2020 - RIGHT 2.5 X 1 CM RC REPAIR , BICEPS TENOTOMY, TOMMY\par \par \par PREVIOUS HPI\par RIGHT SHOULDER CHRONIC PAIN \par FOLLOW UP\par POST OP - 6 MONTHS \par DECEMBER 8 , 2020 - RIGHT 2.5 X 1 CM RC REPAIR , BICEPS TENOTOMY, TOMMY \par PAIN LEVEL: 6/10 \par PAINFUL WHEN REACHING TO BACK \par P.T ENDED IN JANUARY- HELPFUL \par \par

## 2023-03-29 NOTE — DISCUSSION/SUMMARY
[de-identified] : DECEMBER 8 , 2020 - RIGHT 2.5 X 1 CM RC REPAIR , BICEPS TENOTOMY, TOMMY\par \par ULTRASOUND EVALUATION  REVEALS INFLAMMATORY CHANGES WITHOUT SIGNIFICANT TEAR \par PATIENT HAS ELECTED TO PROCEED WITH KENALOG INJECTION SHOULDER \par RISKS AND BENEFITS DISCUSSED - VERBAL CONSENT OBTAINED \par SEE PROCEDURE NOTE\par \par \par POST INJECTION INSTRUCTIONS:\par \par INJECTION THERAPY HANDOUT PROVIDED\par \par COLD THERAPY , ANALGESICS PRN\par \par START P.T.  WITHIN 2 WEEKS AFTER INJECTION - 2 X 4 WEEKS \par \par MRI IF NO RELIEF 12 WEEKS\par